# Patient Record
Sex: FEMALE | Race: WHITE
[De-identification: names, ages, dates, MRNs, and addresses within clinical notes are randomized per-mention and may not be internally consistent; named-entity substitution may affect disease eponyms.]

---

## 2017-05-30 NOTE — PDOC1
OB - History


Hx of Present Pregnancy


Prenatal Care:  Good Care


Ultrasounds:  Normal mid trimester US


Obstetrical Complications:  None


Medical Complications:  None





Past Family/Social History


*


Past Medical, Surgical, Family and Obstetric Histories reviewed from prenatal 

chart.


Rubella:  Immune


RPR/VDRL:  Negative


GBS Status:  Negative


HBsAG:  Negative





OB - Chief Complaint & HPI


Date of Admission:


Date of Admission:  May 30, 2017 at 01:50


Chief Complaint/History


:  2


Para:  1


EGA:  39


Reason for admission:  active labor


Admission Nurse Assessment Rev:  Yes


Problems:  





OB - Admission Exam


Physical Exam


Vitals:





 VS - Last 72 Hours, by Label








  Date Time  Temp Pulse Resp B/P (MAP) Pulse Ox O2 Delivery O2 Flow Rate FiO2


 


17 04:54   18     


 


17 04:04   18     


 


17 03:38 96.3 80 18 116/66 (83)  Room Air  





 96.3       








HEENT:  Normal


Heart:  Regular Rate


Lungs:  Clear


Abdomen:  Gravid, Non tender, Soft


Extremities:  Edema


Reflexes:  Normal


Cervical Dilatation:  3cm


Effacement:  75%


Station:  -3


Membranes:  Intact


Fetal Heart Rate:  Normal


Accelerations:  Accelerations Present


Decelerations:  No decelerations


Contractions on Admission:  6-10 Minutes Apart


Intensity:  Moderate


Text


A: 39 wks IUP


    Active labor


P: Admit for labor management.











LORNE CERVANTES Jr, MD May 30, 2017 08:26

## 2017-05-30 NOTE — PDOC
VAGINAL DELIVERY


DATE


DATE: 17 


TIME: 11:28


:  2


Para:  2


EGA:  39


VAGINAL DELIVERY:  VTX


VACCUM ASSISTED:  No


PLACENTA:  Spontaneous


APGAR


8/9


SEX:  Female


WEIGHT


Weight [ 3395 gm]


Nuchal Cord:  Yes, Times 1


Amniotic Fluid:  Clear


PAIN:  Epidural


EPISIOTOMY:  No


EXTENSION:  No


EBL


300 ml


COMPLICATIONS


none


CONDITION


pt. stable


Signs of Intrauterine Infectio:  None


Shoulder Dystocia:  No


Problems:  











LORNE CERVANTES Jr, MD May 30, 2017 11:29

## 2017-05-31 NOTE — DISCH
DISCHARGE INSTRUCTIONS


Condition on Discharge


Condition on Discharge:  Stable





Activity After Discharge


Activity Instructions for Disc:  Activity as tolerated


Lifting Instructions after Dis:  No heavy lifting


Driving Instructions after Dis:  Do not drive today





Diet after Discharge


Diet after Discharge:  Regular





Contacting the [facility] after DC


Call your doctor for:  Concerns you may have





Follow-Up


Follow up with:  Dr. Matute in 6 weeks.











LORNE MATUTE Jr, MD May 31, 2017 13:43

## 2017-05-31 NOTE — PDOC
OB Progress Note


Date of Service


17


Time of Evaluation


1340


Notes


Pt. feeling well.  Lochia minimal.  Pain controlled.  Breast feeding.


Lab





Laboratory Tests








Test


  17


03:50 17


08:00 17


02:55


 


White Blood Count


  9.2 x10^3/uL


(4.0-11.0) 


  8.9 x10^3/uL


(4.0-11.0)


 


Red Blood Count


  3.25 x10^6/uL


(3.50-5.40) 


  3.13 x10^6/uL


(3.50-5.40)


 


Hemoglobin


  9.5 g/dL


(12.0-15.5) 


  8.9 g/dL


(12.0-15.5)


 


Hematocrit


  28.2 %


(36.0-47.0) 


  26.9 %


(36.0-47.0)


 


Mean Corpuscular Volume 87 fL ()   86 fL () 


 


Mean Corpuscular Hemoglobin 29 pg (25-35)   29 pg (25-35) 


 


Mean Corpuscular Hemoglobin


Concent 34 g/dL


(31-37) 


  33 g/dL


(31-37)


 


Red Cell Distribution Width


  15.1 %


(11.5-14.5) 


  15.2 %


(11.5-14.5)


 


Platelet Count


  126 x10^3/uL


(140-400) 


  114 x10^3/uL


(140-400)


 


RPR Titer Additional Testing


  Non reactive


(Non Reactive) 


  


 


 


Urine Collection Type  Unknown  


 


Urine Color  Yellow  


 


Urine Clarity  Clear  


 


Urine pH  7.0  


 


Urine Specific Gravity  1.015  


 


Urine Protein


  


  Negative mg/dL


(NEG-TRACE) 


 


 


Urine Glucose (UA)


  


  Negative mg/dL


(NEG) 


 


 


Urine Ketones (Stick)


  


  Negative mg/dL


(NEG) 


 


 


Urine Blood  Large (NEG)  


 


Urine Nitrite  Negative (NEG)  


 


Urine Bilirubin  Negative (NEG)  


 


Urine Urobilinogen Dipstick


  


  0.2 mg/dL (0.2


mg/dL) 


 


 


Urine Leukocyte Esterase  Large (NEG)  


 


Urine RBC  3-5 /HPF (0-2)  


 


Urine WBC


  


  11-20 /HPF


(0-4) 


 


 


Urine Squamous Epithelial


Cells 


  Many /LPF 


  


 


 


Urine Bacteria


  


  Mod /HPF


(0-FEW) 


 


 


Urine Mucus  Mod /LPF  


 


Neutrophils (%) (Auto)   67 % (31-73) 


 


Lymphocytes (%) (Auto)   24 % (24-48) 


 


Monocytes (%) (Auto)   8 % (0-9) 


 


Eosinophils (%) (Auto)   1 % (0-3) 


 


Basophils (%) (Auto)   1 % (0-3) 


 


Neutrophils # (Auto)


  


  


  6.0 x10^3uL


(1.8-7.7)


 


Lymphocytes # (Auto)


  


  


  2.1 x10^3/uL


(1.0-4.8)


 


Monocytes # (Auto)


  


  


  0.7 x10^3/uL


(0.0-1.1)


 


Eosinophils # (Auto)


  


  


  0.1 x10^3/uL


(0.0-0.7)


 


Basophils # (Auto)


  


  


  0.0 x10^3/uL


(0.0-0.2)








Laboratory Tests








Test


  17


02:55


 


White Blood Count


  8.9 x10^3/uL


(4.0-11.0)


 


Red Blood Count


  3.13 x10^6/uL


(3.50-5.40)


 


Hemoglobin


  8.9 g/dL


(12.0-15.5)


 


Hematocrit


  26.9 %


(36.0-47.0)


 


Mean Corpuscular Volume 86 fL () 


 


Mean Corpuscular Hemoglobin 29 pg (25-35) 


 


Mean Corpuscular Hemoglobin


Concent 33 g/dL


(31-37)


 


Red Cell Distribution Width


  15.2 %


(11.5-14.5)


 


Platelet Count


  114 x10^3/uL


(140-400)


 


Neutrophils (%) (Auto) 67 % (31-73) 


 


Lymphocytes (%) (Auto) 24 % (24-48) 


 


Monocytes (%) (Auto) 8 % (0-9) 


 


Eosinophils (%) (Auto) 1 % (0-3) 


 


Basophils (%) (Auto) 1 % (0-3) 


 


Neutrophils # (Auto)


  6.0 x10^3uL


(1.8-7.7)


 


Lymphocytes # (Auto)


  2.1 x10^3/uL


(1.0-4.8)


 


Monocytes # (Auto)


  0.7 x10^3/uL


(0.0-1.1)


 


Eosinophils # (Auto)


  0.1 x10^3/uL


(0.0-0.7)


 


Basophils # (Auto)


  0.0 x10^3/uL


(0.0-0.2)








Medications





Current Medications


Sodium Chloride (Normal Saline Flush) 3 ml QSHIFT  PRN IV AFTER MEDS AND BLOOD 

DRAWS;  Start 17 at 03:00;  Stop 17 at 16:51;  Status DC


Ringer's Solution 1,000 ml @  125 mls/hr Q8H IV ;  Start 17 at 02:48;  

Stop 17 at 16:51;  Status DC


Fentanyl Citrate (Fentanyl 2ml Vial) 100 mcg PRN Q20MIN  PRN IV Labor pain Last 

administered on  04:04;  Start 17 at 03:00;  Stop 17 at 16:51

;  Status DC


Terbutaline Sulfate (Brethine) 0.25 mg 1X PRN  PRN SQ SEE COMMENTS;  Start  at 03:00;  Stop 17 at 16:51;  Status DC


Lidocaine HCl 30 ml 1X PRN  PRN INJ SEE COMMENTS;  Start 17 at 03:00;  

Stop 17 at 16:51;  Status DC


Oxytocin/Sodium Chloride 500 ml @ 0 mls/hr CONT  PRN IV SEE I/O RECORD Last 

administered on  07:46;  Start 17 at 03:00;  Stop 17 at 16:51

;  Status DC


Oxytocin/Sodium Chloride 500 ml @ 0 mls/hr CONT PRN  PRN IV Post delivery 

bleeding;  Start 17 at 03:00;  Stop 17 at 16:51;  Status DC


Ibuprofen (Motrin) 600 mg PRN Q6HRS  PRN PO MODERATE PAIN;  Start 17 at 03:

00;  Stop 17 at 20:44;  Status DC


Ropivacaine/ Fentanyl/ ml @  As Directed STK-MED ONCE EP ;  Start 17 

at 04:00;  Stop 17 at 20:44;  Status DC


Ropivacaine 40 mg STK-MED ONCE .ROUTE ;  Start 17 at 04:01;  Stop 17 

at 20:44;  Status DC


Ringer's Solution 1,000 ml @  1,000 mls/hr Q1H IV ;  Start 17 at 04:13;  

Stop 17 at 05:12;  Status DC


Ephedrine Sulfate 10 mg PRN Q2MIN  PRN IV IF SBP<90;  Start 17 at 04:15;  

Stop 17 at 16:51;  Status DC


Naloxone HCl (Narcan) 0.4 mg PRN Q1MIN  PRN IV SEE COMMENTS;  Start 17 at 

04:15;  Stop 17 at 16:51;  Status DC


Fentanyl Citrate (Fentanyl 2ml Vial) 100 mcg PRN 1X  PRN EPI FOR ANESTHESIA;  

Start 17 at 04:15;  Stop 17 at 16:51;  Status DC


Ropivacaine/ Fentanyl/ ml @  14 mls/hr CONT  PRN EP PAIN Last 

administered on  04:54;  Start 17 at 04:15;  Stop 17 at 16:51

;  Status DC


Ondansetron HCl (Zofran) 4 mg PRN Q6HRS  PRN IV NAUSEA/VOMITING Last 

administered on  05:18;  Start 17 at 04:15;  Stop 17 at 16:51

;  Status DC


Ropivacaine 40 mg PRN 1X  PRN EPI SEE COMMENTS;  Start 17 at 04:15;  Stop  at 16:51;  Status DC


Ephedrine Sulfate (Akovaz) 50 mg STK-MED ONCE .ROUTE ;  Start 17 at 04:52;

  Stop 17 at 20:44;  Status DC


Ropivacaine 40 mg STK-MED ONCE .ROUTE ;  Start 17 at 04:00;  Stop 17 

at 20:44;  Status DC


Pantoprazole Sodium (Protonix Vial) 40 mg 1X  ONCE IVP  Last administered on  09:48;  Start 17 at 09:00;  Stop 17 at 16:51;  Status DC


Sodium Chloride (Normal Saline Flush) 10 ml QSHIFT  PRN IV AFTER MEDS AND BLOOD 

DRAWS;  Start 17 at 11:30;  Stop 17 at 16:51;  Status DC


Oxytocin/Sodium Chloride 500 ml @  62.5 mls/hr CONT  PRN IV SEE I/O RECORD;  

Start 17 at 11:30;  Stop 17 at 16:51;  Status DC


Acetaminophen (Tylenol) 650 mg PRN Q6HRS  PRN PO MILD PAIN / TEMP;  Start  at 11:30


Ibuprofen (Motrin) 800 mg PRN Q8HRS  PRN PO INFLAMMATION/PAIN PREVENTION Last 

administered on  08:46;  Start 17 at 11:30


Docusate Sodium (Colace) 100 mg PRN BID  PRN PO CONSTIPATION Last administered 

on  08:45;  Start 17 at 11:30


Magnesium Hydroxide (Milk Of Magnesia) 2,400 mg PRN DAILY  PRN PO CONSTIPATION;

  Start 17 at 11:30


Al Hydroxide/Mg Hydroxide (Mylanta Plus Xs) 30 ml PRN Q4HRS  PRN PO HEARTBURN / 

GAS;  Start 17 at 11:30


Simethicone (Gas-X) 80 mg PRN AFTMEALHC  PRN PO GAS / BLOATING;  Start 17 

at 11:30


Diphenhydramine HCl (Benadryl) 25 mg PRN Q6HRS  PRN PO ITCHING;  Start 17 

at 11:30


Benzocaine (Americaine) 1 spray PRN QID  PRN TP TOPICAL PAIN Last administered 

on  13:41;  Start 17 at 11:30


Phenyleph/Shark Oil/Min Oil/Petrol (Preparation H) 1 jesse PRN QID  PRN RC RECTAL 

PAIN;  Start 17 at 11:30


Hydrocortisone (Cortaid) 1 jesse PRN QID  PRN TP PERINEAL PAIN;  Start 17 at 

11:30


Ferrous Sulfate (Feosol) 325 mg BIDWMEALS PO ;  Start 17 at 08:00;  Stop  at 08:00;  Status DC


Zolpidem Tartrate (Ambien) 5 mg PRN QHS  PRN PO INSOMNIA, MAY REPEAT X1;  Start 

17 at 11:30


Info (Do NOT chart on this placeholder) 1 ea 1X PRN  PRN MC SEE COMMENTS;  

Start 17 at 11:30;  Stop 17 at 16:51;  Status DC


Info (Do NOT chart on this placeholder) 1 ea 1X PRN  PRN MC SEE COMMENTS;  

Start 17 at 11:30;  Stop 17 at 16:51;  Status DC


Oxycodone/ Acetaminophen (Percocet 5/325) 2 tab PRN Q4HRS  PRN PO MODERATE PAIN

, SEVERE PAIN Last administered on t 02:31;  Start 17 at 11:30


Ferrous Sulfate 300 mg BIDWMEALS PO ;  Start 17 at 17:30





Active Scripts


Active


Tussionex Pennkinetic Susp (Hydrocodone/Chlorphen Polis) 480 Ml Sravani.er.12h 5 Ml 

PO BID


Ventolin Hfa Inhaler (Albuterol Sulfate) 18 Gm Hfa.aer.ad 2 Puff INH Q4HRS


Prednisone 20 Mg Tablet 2 Tab PO DAILY 5 Days


Clarithromycin 500 Mg Tablet 1 Tab PO BID


Amoxicillin 500 Mg Tablet 1 Tab PO BID


Reported


Tri-Sprintec (Norgestimate-Ethinyl Estradiol) 1 Each Tablet 1 Tab PO DAILY


Vitamin B-12 (Cyanocobalamin (Vitamin B-12)) 1,000 Mcg Tablet 1 Tab PO DAILY


Vitamin D (Cholecalciferol (Vitamin D3)) 2,000 Unit Capsule 1 Cap PO DAILY


Fludrocortisone Acetate 0.1 Mg Tablet 1 Tab PO DAILY


Exam


Abd: soft,non tender, fundus firm


Assessment


PPD#1 s/p 


Plan of Care:  See new orders (D/c home.)











LORNE CERVANTES Jr, MD May 31, 2017 13:41

## 2018-04-21 VITALS
DIASTOLIC BLOOD PRESSURE: 62 MMHG | SYSTOLIC BLOOD PRESSURE: 106 MMHG | DIASTOLIC BLOOD PRESSURE: 62 MMHG | SYSTOLIC BLOOD PRESSURE: 106 MMHG

## 2021-01-23 ENCOUNTER — HOSPITAL ENCOUNTER (OUTPATIENT)
Dept: HOSPITAL 61 - LAB | Age: 31
End: 2021-01-23
Attending: INTERNAL MEDICINE
Payer: COMMERCIAL

## 2021-01-23 DIAGNOSIS — R09.81: ICD-10-CM

## 2021-01-23 DIAGNOSIS — R51.9: Primary | ICD-10-CM

## 2021-01-23 DIAGNOSIS — Z20.828: ICD-10-CM

## 2021-01-23 DIAGNOSIS — R11.2: ICD-10-CM

## 2021-01-23 DIAGNOSIS — R19.7: ICD-10-CM

## 2021-01-23 PROCEDURE — U0003 INFECTIOUS AGENT DETECTION BY NUCLEIC ACID (DNA OR RNA); SEVERE ACUTE RESPIRATORY SYNDROME CORONAVIRUS 2 (SARS-COV-2) (CORONAVIRUS DISEASE [COVID-19]), AMPLIFIED PROBE TECHNIQUE, MAKING USE OF HIGH THROUGHPUT TECHNOLOGIES AS DESCRIBED BY CMS-2020-01-R: HCPCS

## 2021-01-28 ENCOUNTER — HOSPITAL ENCOUNTER (OUTPATIENT)
Dept: HOSPITAL 61 - LAB | Age: 31
End: 2021-01-28
Attending: INTERNAL MEDICINE
Payer: COMMERCIAL

## 2021-01-28 DIAGNOSIS — Z20.822: ICD-10-CM

## 2021-01-28 DIAGNOSIS — R06.02: Primary | ICD-10-CM

## 2021-01-28 DIAGNOSIS — R51.9: ICD-10-CM

## 2021-01-28 DIAGNOSIS — J02.9: ICD-10-CM

## 2021-01-28 PROCEDURE — U0003 INFECTIOUS AGENT DETECTION BY NUCLEIC ACID (DNA OR RNA); SEVERE ACUTE RESPIRATORY SYNDROME CORONAVIRUS 2 (SARS-COV-2) (CORONAVIRUS DISEASE [COVID-19]), AMPLIFIED PROBE TECHNIQUE, MAKING USE OF HIGH THROUGHPUT TECHNOLOGIES AS DESCRIBED BY CMS-2020-01-R: HCPCS

## 2021-03-05 ENCOUNTER — HOSPITAL ENCOUNTER (EMERGENCY)
Dept: HOSPITAL 61 - ER | Age: 31
Discharge: HOME | End: 2021-03-05
Payer: COMMERCIAL

## 2021-03-05 VITALS
SYSTOLIC BLOOD PRESSURE: 105 MMHG | SYSTOLIC BLOOD PRESSURE: 105 MMHG | DIASTOLIC BLOOD PRESSURE: 67 MMHG | SYSTOLIC BLOOD PRESSURE: 105 MMHG | SYSTOLIC BLOOD PRESSURE: 105 MMHG | DIASTOLIC BLOOD PRESSURE: 67 MMHG | DIASTOLIC BLOOD PRESSURE: 67 MMHG | DIASTOLIC BLOOD PRESSURE: 67 MMHG

## 2021-03-05 VITALS — WEIGHT: 171.96 LBS | BODY MASS INDEX: 25.47 KG/M2 | HEIGHT: 69 IN

## 2021-03-05 DIAGNOSIS — R55: Primary | ICD-10-CM

## 2021-03-05 DIAGNOSIS — R11.10: ICD-10-CM

## 2021-03-05 DIAGNOSIS — H66.93: ICD-10-CM

## 2021-03-05 LAB
% LYMPHS: 5 % (ref 24–48)
% MONOS: 4 % (ref 0–10)
% SEGS: 87 % (ref 35–66)
ALBUMIN SERPL-MCNC: 3.3 G/DL (ref 3.4–5)
ALBUMIN/GLOB SERPL: 1 {RATIO} (ref 1–1.7)
ALP SERPL-CCNC: 50 U/L (ref 46–116)
ALT SERPL-CCNC: 33 U/L (ref 14–59)
ANION GAP SERPL CALC-SCNC: 8 MMOL/L (ref 6–14)
APTT PPP: YELLOW S
AST SERPL-CCNC: 29 U/L (ref 15–37)
BACTERIA #/AREA URNS HPF: (no result) /HPF
BASOPHILS # BLD AUTO: 0 X10^3/UL (ref 0–0.2)
BASOPHILS NFR BLD AUTO: 2 % (ref 0–3)
BASOPHILS NFR BLD: 0 % (ref 0–3)
BILIRUB SERPL-MCNC: 0.6 MG/DL (ref 0.2–1)
BILIRUB UR QL STRIP: NEGATIVE
BUN SERPL-MCNC: 14 MG/DL (ref 7–20)
BUN/CREAT SERPL: 20 (ref 6–20)
CALCIUM SERPL-MCNC: 8.2 MG/DL (ref 8.5–10.1)
CHLORIDE SERPL-SCNC: 107 MMOL/L (ref 98–107)
CO2 SERPL-SCNC: 28 MMOL/L (ref 21–32)
CREAT SERPL-MCNC: 0.7 MG/DL (ref 0.6–1)
EOSINOPHIL NFR BLD AUTO: 2 % (ref 0–5)
EOSINOPHIL NFR BLD: 0.2 X10^3/UL (ref 0–0.7)
EOSINOPHIL NFR BLD: 1 % (ref 0–3)
ERYTHROCYTE [DISTWIDTH] IN BLOOD BY AUTOMATED COUNT: 14.9 % (ref 11.5–14.5)
FIBRINOGEN PPP-MCNC: CLEAR MG/DL
GFR SERPLBLD BASED ON 1.73 SQ M-ARVRAT: 97 ML/MIN
GLUCOSE SERPL-MCNC: 93 MG/DL (ref 70–99)
HCT VFR BLD CALC: 34.9 % (ref 36–47)
HGB BLD-MCNC: 11.8 G/DL (ref 12–15.5)
LYMPHOCYTES # BLD: 0.5 X10^3/UL (ref 1–4.8)
LYMPHOCYTES NFR BLD AUTO: 5 % (ref 24–48)
MCH RBC QN AUTO: 30 PG (ref 25–35)
MCHC RBC AUTO-ENTMCNC: 34 G/DL (ref 31–37)
MCV RBC AUTO: 88 FL (ref 79–100)
MONO #: 0.5 X10^3/UL (ref 0–1.1)
MONOCYTES NFR BLD: 5 % (ref 0–9)
NEUT #: 9.7 X10^3/UL (ref 1.8–7.7)
NEUTROPHILS NFR BLD AUTO: 89 % (ref 31–73)
NITRITE UR QL STRIP: NEGATIVE
PH UR STRIP: 7.5 [PH]
PLATELET # BLD AUTO: 131 X10^3/UL (ref 140–400)
PLATELET # BLD EST: ADEQUATE 10*3/UL
POTASSIUM SERPL-SCNC: 3.5 MMOL/L (ref 3.5–5.1)
PROT SERPL-MCNC: 6.5 G/DL (ref 6.4–8.2)
PROT UR STRIP-MCNC: NEGATIVE MG/DL
RBC # BLD AUTO: 3.98 X10^6/UL (ref 3.5–5.4)
RBC #/AREA URNS HPF: 0 /HPF (ref 0–2)
SODIUM SERPL-SCNC: 143 MMOL/L (ref 136–145)
UROBILINOGEN UR-MCNC: 0.2 MG/DL
WBC # BLD AUTO: 10.9 X10^3/UL (ref 4–11)
WBC #/AREA URNS HPF: (no result) /HPF (ref 0–4)

## 2021-03-05 PROCEDURE — 81001 URINALYSIS AUTO W/SCOPE: CPT

## 2021-03-05 PROCEDURE — 71045 X-RAY EXAM CHEST 1 VIEW: CPT

## 2021-03-05 PROCEDURE — 99285 EMERGENCY DEPT VISIT HI MDM: CPT

## 2021-03-05 PROCEDURE — 96374 THER/PROPH/DIAG INJ IV PUSH: CPT

## 2021-03-05 PROCEDURE — 81025 URINE PREGNANCY TEST: CPT

## 2021-03-05 PROCEDURE — 80053 COMPREHEN METABOLIC PANEL: CPT

## 2021-03-05 PROCEDURE — 84484 ASSAY OF TROPONIN QUANT: CPT

## 2021-03-05 PROCEDURE — 85025 COMPLETE CBC W/AUTO DIFF WBC: CPT

## 2021-03-05 PROCEDURE — 85007 BL SMEAR W/DIFF WBC COUNT: CPT

## 2021-03-05 PROCEDURE — 93005 ELECTROCARDIOGRAM TRACING: CPT

## 2021-03-05 PROCEDURE — 36415 COLL VENOUS BLD VENIPUNCTURE: CPT

## 2021-03-05 PROCEDURE — 96361 HYDRATE IV INFUSION ADD-ON: CPT

## 2021-03-05 PROCEDURE — 70450 CT HEAD/BRAIN W/O DYE: CPT

## 2021-03-05 NOTE — RAD
INDICATION: Reason: syncope / Spl. Instructions:  / History: 



COMPARISON: October 2013



FINDINGS:



Single view of chest obtained.

No focal airspace consolidation.  

Cardiomediastinal contour unremarkable.



No acute osseous abnormality.





IMPRESSION:



*  No focal airspace consolidation or edema.



Electronically signed by: Sundar Becerra MD (3/5/2021 2:13 PM) [id number]

## 2021-03-05 NOTE — RAD
RS Compliance Statement:



One or more of the following individualized dose reduction techniques were utilized for this examinat
ion:  

1. Automated exposure control  

2. Adjustment of the mA and/or kV according to patient size  

3. Use of iterative reconstruction technique



CT head without contrast 3/5/2021 4:25 PM



INDICATION: Syncope, seizure



COMPARISON: CT head and cervical spine 12/1/2014



TECHNIQUE: Multiple axial CT images of the head were obtained from skull base through the vertex with
out intravenous contrast. 



FINDINGS:



Head:



Ventricles, sulci and basal cisterns are within normal limits. There is no hydrocephalus. Gray-white 
matter differentiation is normal. There is no acute intracranial hemorrhage. There is no mass, mass e
ffect or midline shift. Posterior fossa is normal in appearance.



Visualized portions of the orbits are normal. Paranasal sinuses are well aerated. Mastoid air cells a
re well aerated. Scalp and calvaria are normal.





IMPRESSION:



No acute intracranial hemorrhage.



Electronically signed by: Nava Vazquez MD (3/5/2021 4:58 PM) [id number]

## 2021-03-05 NOTE — ED.ADGEN
Past Medical History


Past Medical History:  Other


Additional Past Medical Histor:  Syncope, Bradycardia (intermittent), insulin 

resistance, ortho hypotension


Past Surgical History:  Cholecystectomy, Other


Additional Past Surgical Histo:  Endoscopic back surgery.


Smoking Status:  Never Smoker


Alcohol Use:  Occasionally


Drug Use:  None





General Adult


EDM:


Chief Complaint:  SYNCOPE





HPI:


HPI:





Patient is a 31  year old [f__sex] who presents with []





Review of Systems:


Review of Systems:


Constitutional:   Denies fever or chills. []


Eyes:   Denies change in visual acuity. []


HENT:   Denies nasal congestion or sore throat. [] 


Respiratory:   Denies cough or shortness of breath. [] 


Cardiovascular:   Denies chest pain or edema. [] 


GI:   Denies abdominal pain, nausea, vomiting, bloody stools or diarrhea. [] 


:  Denies dysuria. [] 


Musculoskeletal:   Denies back pain or joint pain. [] 


Integument:   Denies rash. [] 


Neurologic:   Denies headache, focal weakness or sensory changes. [] 


Endocrine:   Denies polyuria or polydipsia. [] 


Lymphatic:  Denies swollen glands. [] 


Psychiatric:  Denies depression or anxiety. []





Current Medications:





Current Medications








 Medications


  (Trade)  Dose


 Ordered  Sig/Tierney  Start Time


 Stop Time Status Last Admin


Dose Admin


 


 Ondansetron HCl


  (Zofran)  4 mg  1X  ONCE  3/5/21 14:15


 3/5/21 14:16 DC 3/5/21 14:11


4 MG


 


 Sodium Chloride  1,000 ml @ 


 1,000 mls/hr  1X  ONCE  3/5/21 15:45


 3/5/21 16:44 DC 3/5/21 15:40


1,000 MLS/HR











Allergies:


Allergies:





Allergies








Coded Allergies Type Severity Reaction Last Updated Verified


 


  tramadol Allergy Severe SEIZURES 5/30/17 Yes


 


  ceftriaxone Allergy Intermediate Itching 4/20/18 Yes











Physical Exam:


PE:





Constitutional: Well developed, well nourished, no acute distress, non-toxic 

appearance. []


HENT: Normocephalic, atraumatic, bilateral external ears normal, oropharynx 

moist, no oral exudates, nose normal. []


Eyes: PERRLA, EOMI, conjunctiva normal, no discharge. [] 


Neck: Normal range of motion, no tenderness, supple, no stridor. [] 


Cardiovascular:Heart rate regular rhythm, no murmur []


Lungs & Thorax:  Bilateral breath sounds clear to auscultation []


Abdomen: Bowel sounds normal, soft, no tenderness, no masses, no pulsatile 

masses. [] 


Skin: Warm, dry, no erythema, no rash. [] 


Back: No tenderness, no CVA tenderness. [] 


Extremities: No tenderness, no cyanosis, no clubbing, ROM intact, no edema. [] 


Neurologic: Alert and oriented X 3, normal motor function, normal sensory 

function, no focal deficits noted. []


Psychologic: Affect normal, judgement normal, mood normal. []





Current Patient Data:


Labs:





                                Laboratory Tests








Test


 3/5/21


14:00 3/5/21


16:00 3/5/21


16:14


 


White Blood Count


 10.9 x10^3/uL


(4.0-11.0) 


 





 


Red Blood Count


 3.98 x10^6/uL


(3.50-5.40) 


 





 


Hemoglobin


 11.8 g/dL


(12.0-15.5)  L 


 





 


Hematocrit


 34.9 %


(36.0-47.0)  L 


 





 


Mean Corpuscular Volume


 88 fL ()


 


 





 


Mean Corpuscular Hemoglobin 30 pg (25-35)    


 


Mean Corpuscular Hemoglobin


Concent 34 g/dL


(31-37) 


 





 


Red Cell Distribution Width


 14.9 %


(11.5-14.5)  H 


 





 


Platelet Count


 131 x10^3/uL


(140-400)  L 


 





 


Neutrophils (%) (Auto) 89 % (31-73)  H  


 


Lymphocytes (%) (Auto) 5 % (24-48)  L  


 


Monocytes (%) (Auto) 5 % (0-9)    


 


Eosinophils (%) (Auto) 1 % (0-3)    


 


Basophils (%) (Auto) 0 % (0-3)    


 


Neutrophils # (Auto)


 9.7 x10^3/uL


(1.8-7.7)  H 


 





 


Lymphocytes # (Auto)


 0.5 x10^3/uL


(1.0-4.8)  L 


 





 


Monocytes # (Auto)


 0.5 x10^3/uL


(0.0-1.1) 


 





 


Eosinophils # (Auto)


 0.2 x10^3/uL


(0.0-0.7) 


 





 


Basophils # (Auto)


 0.0 x10^3/uL


(0.0-0.2) 


 





 


Segmented Neutrophils % 87 % (35-66)  H  


 


Lymphocytes % 5 % (24-48)  L  


 


Monocytes % 4 % (0-10)    


 


Eosinophils % 2 % (0-5)    


 


Basophils % 2 % (0-3)    


 


Platelet Estimate


 Adequate


(ADEQUATE) 


 





 


Sodium Level


 143 mmol/L


(136-145) 


 





 


Potassium Level


 3.5 mmol/L


(3.5-5.1) 


 





 


Chloride Level


 107 mmol/L


() 


 





 


Carbon Dioxide Level


 28 mmol/L


(21-32) 


 





 


Anion Gap 8 (6-14)    


 


Blood Urea Nitrogen


 14 mg/dL


(7-20) 


 





 


Creatinine


 0.7 mg/dL


(0.6-1.0) 


 





 


Estimated GFR


(Cockcroft-Gault) 97.0  


 


 





 


BUN/Creatinine Ratio 20 (6-20)    


 


Glucose Level


 93 mg/dL


(70-99) 


 





 


Calcium Level


 8.2 mg/dL


(8.5-10.1)  L 


 





 


Total Bilirubin


 0.6 mg/dL


(0.2-1.0) 


 





 


Aspartate Amino Transferase


(AST) 29 U/L (15-37)


 


 





 


Alanine Aminotransferase (ALT)


 33 U/L (14-59)


 


 





 


Alkaline Phosphatase


 50 U/L


() 


 





 


Troponin I Quantitative


 < 0.017 ng/mL


(0.000-0.055) 


 





 


Total Protein


 6.5 g/dL


(6.4-8.2) 


 





 


Albumin


 3.3 g/dL


(3.4-5.0)  L 


 





 


Albumin/Globulin Ratio 1.0 (1.0-1.7)    


 


Urine Collection Type  Unknown   


 


Urine Color  Yellow   


 


Urine Clarity  Clear   


 


Urine pH


 


 7.5 (<5.0-8.0)


 





 


Urine Specific Gravity


 


 1.015


(1.000-1.030) 





 


Urine Protein


 


 Negative mg/dL


(NEG-TRACE) 





 


Urine Glucose (UA)


 


 Negative mg/dL


(NEG) 





 


Urine Ketones (Stick)


 


 >=80 mg/dL


(NEG) 





 


Urine Blood


 


 Negative (NEG)


 





 


Urine Nitrite


 


 Negative (NEG)


 





 


Urine Bilirubin


 


 Negative (NEG)


 





 


Urine Urobilinogen Dipstick


 


 0.2 mg/dL (0.2


mg/dL) 





 


Urine Leukocyte Esterase  Trace (NEG)   


 


Urine RBC  0 /HPF (0-2)   


 


Urine WBC


 


 1-4 /HPF (0-4)


 





 


Urine Squamous Epithelial


Cells 


 Few /LPF  


 





 


Urine Bacteria


 


 Few /HPF


(0-FEW) 





 


POC Urine HCG, Qualitative


 


 


 Hcg negative


(Negative)





                                Laboratory Tests


3/5/21 14:00








                                Laboratory Tests


3/5/21 14:00











Vital Signs:





                                   Vital Signs








  Date Time  Temp Pulse Resp B/P (MAP) Pulse Ox O2 Delivery O2 Flow Rate FiO2


 


3/5/21 16:03  82 20 79/53 (62)  Room Air  


 


3/5/21 15:17     100   











EKG:


EKG:


[]





Heart Score:


Risk Factors:


Risk Factors:  DM, Current or recent (<one month) smoker, HTN, HLP, family 

history of CAD, obesity.


Risk Scores:


Score 0 - 3:  2.5% MACE over next 6 weeks - Discharge Home


Score 4 - 6:  20.3% MACE over next 6 weeks - Admit for Clinical Observation


Score 7 - 10:  72.7% MACE over next 6 weeks - Early Invasive Strategies





Radiology/Procedures:


Radiology/Procedures:


[]





Course & Med Decision Making:


Course & Med Decision Making


Pertinent Labs and Imaging studies reviewed. (See chart for details)





[]





Dragon Disclaimer:


Dragon Disclaimer:


This electronic medical record was generated, in whole or in part, using a voice

 recognition dictation system.





Departure


Departure


Impression:  


   Primary Impression:  


   Syncope


   Additional Impression:  


   Otitis media


Disposition:  01 DC HOME SELF CARE/HOMELESS


Condition:  STABLE


Referrals:  


YOLIE DIALLO MD (PCP)


Patient Instructions:  Syncope


Scripts


Azithromycin (ZITHROMAX) 250 Mg Tablet


1 PKG PO UD, #6 TAB


   Prov: MARY VAZQUEZ MD         3/5/21





Problem Qualifiers











MARY VAZQUEZ MD               Mar 5, 2021 17:24

## 2021-03-05 NOTE — EKG
[facility]

              [address] [place] [address]

Test Date:    2021               Test Time:    13:51:31

Pat Name:     CESARIO MENDOZA             Department:   

Patient ID:   [id number]           Room:          

Gender:       F                        Technician:   

:          [id number]               Requested By: MARY VAZQUEZ

Order Number: [id number]           Reading MD:     

                                 Measurements

Intervals                              Axis          

Rate:         82                       P:            56

MN:           154                      QRS:          56

QRSD:         78                       T:            26

QT:           382                                    

QTc:          449                                    

                           Interpretive Statements

SINUS RHYTHM

NORMAL ECG

RI6.02

No previous ECG available for comparison

## 2021-09-16 ENCOUNTER — HOSPITAL ENCOUNTER (OUTPATIENT)
Dept: HOSPITAL 61 - LAB | Age: 31
End: 2021-09-16
Attending: INTERNAL MEDICINE
Payer: COMMERCIAL

## 2021-09-16 DIAGNOSIS — Z20.822: Primary | ICD-10-CM

## 2021-09-16 PROCEDURE — U0003 INFECTIOUS AGENT DETECTION BY NUCLEIC ACID (DNA OR RNA); SEVERE ACUTE RESPIRATORY SYNDROME CORONAVIRUS 2 (SARS-COV-2) (CORONAVIRUS DISEASE [COVID-19]), AMPLIFIED PROBE TECHNIQUE, MAKING USE OF HIGH THROUGHPUT TECHNOLOGIES AS DESCRIBED BY CMS-2020-01-R: HCPCS

## 2021-09-23 ENCOUNTER — HOSPITAL ENCOUNTER (OUTPATIENT)
Dept: HOSPITAL 61 - LAB | Age: 31
End: 2021-09-23
Attending: INTERNAL MEDICINE
Payer: COMMERCIAL

## 2021-09-23 DIAGNOSIS — Z20.822: ICD-10-CM

## 2021-09-23 DIAGNOSIS — R09.81: ICD-10-CM

## 2021-09-23 DIAGNOSIS — R51.9: ICD-10-CM

## 2021-09-23 DIAGNOSIS — R05: Primary | ICD-10-CM

## 2021-09-23 PROCEDURE — U0003 INFECTIOUS AGENT DETECTION BY NUCLEIC ACID (DNA OR RNA); SEVERE ACUTE RESPIRATORY SYNDROME CORONAVIRUS 2 (SARS-COV-2) (CORONAVIRUS DISEASE [COVID-19]), AMPLIFIED PROBE TECHNIQUE, MAKING USE OF HIGH THROUGHPUT TECHNOLOGIES AS DESCRIBED BY CMS-2020-01-R: HCPCS

## 2022-01-18 ENCOUNTER — HOSPITAL ENCOUNTER (OUTPATIENT)
Dept: HOSPITAL 61 - LAB | Age: 32
End: 2022-01-18
Attending: INTERNAL MEDICINE
Payer: COMMERCIAL

## 2022-01-18 DIAGNOSIS — J02.9: ICD-10-CM

## 2022-01-18 DIAGNOSIS — U07.1: Primary | ICD-10-CM

## 2022-01-18 PROCEDURE — U0003 INFECTIOUS AGENT DETECTION BY NUCLEIC ACID (DNA OR RNA); SEVERE ACUTE RESPIRATORY SYNDROME CORONAVIRUS 2 (SARS-COV-2) (CORONAVIRUS DISEASE [COVID-19]), AMPLIFIED PROBE TECHNIQUE, MAKING USE OF HIGH THROUGHPUT TECHNOLOGIES AS DESCRIBED BY CMS-2020-01-R: HCPCS
